# Patient Record
Sex: FEMALE | Race: WHITE | NOT HISPANIC OR LATINO | URBAN - METROPOLITAN AREA
[De-identification: names, ages, dates, MRNs, and addresses within clinical notes are randomized per-mention and may not be internally consistent; named-entity substitution may affect disease eponyms.]

---

## 2020-10-12 ENCOUNTER — OFFICE VISIT (OUTPATIENT)
Dept: URBAN - METROPOLITAN AREA CLINIC 80 | Facility: CLINIC | Age: 20
End: 2020-10-12

## 2020-10-22 ENCOUNTER — LAB OUTSIDE AN ENCOUNTER (OUTPATIENT)
Dept: URBAN - METROPOLITAN AREA CLINIC 80 | Facility: CLINIC | Age: 20
End: 2020-10-22

## 2020-10-22 ENCOUNTER — OFFICE VISIT (OUTPATIENT)
Dept: URBAN - METROPOLITAN AREA CLINIC 80 | Facility: CLINIC | Age: 20
End: 2020-10-22

## 2020-10-22 ENCOUNTER — WEB ENCOUNTER (OUTPATIENT)
Dept: URBAN - METROPOLITAN AREA CLINIC 80 | Facility: CLINIC | Age: 20
End: 2020-10-22

## 2020-10-22 DIAGNOSIS — K51.90 ULCERATIVE COLITIS WITHOUT COMPLICATIONS, UNSPECIFIED LOCATION: ICD-10-CM

## 2020-10-22 PROCEDURE — G8420 CALC BMI NORM PARAMETERS: HCPCS | Performed by: INTERNAL MEDICINE

## 2020-10-22 PROCEDURE — G8484 FLU IMMUNIZE NO ADMIN: HCPCS | Performed by: INTERNAL MEDICINE

## 2020-10-22 PROCEDURE — G8427 DOCREV CUR MEDS BY ELIG CLIN: HCPCS | Performed by: INTERNAL MEDICINE

## 2020-10-22 PROCEDURE — 99203 OFFICE O/P NEW LOW 30 MIN: CPT | Performed by: INTERNAL MEDICINE

## 2020-10-22 PROCEDURE — 1036F TOBACCO NON-USER: CPT | Performed by: INTERNAL MEDICINE

## 2020-10-22 RX ORDER — AZATHIOPRINE 50 MG/1
2 TABLETS TABLET ORAL QD
Qty: 180 TABLET | Refills: 1 | OUTPATIENT
Start: 2020-10-22 | End: 2021-04-20

## 2020-10-22 RX ORDER — MESALAMINE 1000 MG/1
1 SUPPOSITORY AT BEDTIME SUPPOSITORY RECTAL ONCE A DAY
Qty: 90 | Refills: 3 | OUTPATIENT
Start: 2020-10-22 | End: 2021-10-17

## 2020-10-22 RX ORDER — MESALAMINE 1000 MG/1
1 SUPPOSITORY AT BEDTIME SUPPOSITORY RECTAL ONCE A DAY
Status: ACTIVE | COMMUNITY

## 2020-10-22 RX ORDER — MESALAMINE 800 MG/1
2 TABLETS TABLET, DELAYED RELEASE ORAL THREE TIMES A DAY
Qty: 540 TABLET | Refills: 3 | OUTPATIENT
Start: 2020-10-22 | End: 2021-10-17

## 2020-10-22 NOTE — HPI-TODAY'S VISIT:
Dx UC 2012  Needing to establish care  Has been on Mesalamine po and supp, Azathioprine  Last Colonoscopy only last 2 inches involved Having yearly Colonoscopy  GM, mom, sister all have UC

## 2020-10-23 LAB
A/G RATIO: 1.8
ALBUMIN: 4.8
ALKALINE PHOSPHATASE: 48
ALT (SGPT): 7
AST (SGOT): 17
BASO (ABSOLUTE): 0
BASOS: 1
BILIRUBIN, TOTAL: 0.8
BUN/CREATININE RATIO: 13
BUN: 11
C-REACTIVE PROTEIN, QUANT: 2
CALCIUM: 9.5
CARBON DIOXIDE, TOTAL: 21
CHLORIDE: 106
CREATININE: 0.83
EGFR IF AFRICN AM: 116
EGFR IF NONAFRICN AM: 100
EOS (ABSOLUTE): 0.1
EOS: 2
GLOBULIN, TOTAL: 2.6
GLUCOSE: 75
HEMATOCRIT: 37.6
HEMATOLOGY COMMENTS:: (no result)
HEMOGLOBIN: 13.6
IMMATURE CELLS: (no result)
IMMATURE GRANS (ABS): 0
IMMATURE GRANULOCYTES: 0
LYMPHS (ABSOLUTE): 0.7
LYMPHS: 18
MCH: 33.4
MCHC: 36.2
MCV: 92
MONOCYTES(ABSOLUTE): 0.3
MONOCYTES: 7
NEUTROPHILS (ABSOLUTE): 2.7
NEUTROPHILS: 72
NRBC: (no result)
PLATELETS: 249
POTASSIUM: 4.3
PROTEIN, TOTAL: 7.4
RBC: 4.07
RDW: 12.7
SEDIMENTATION RATE-WESTERGREN: 4
SODIUM: 140
WBC: 3.8

## 2021-01-08 ENCOUNTER — OFFICE VISIT (OUTPATIENT)
Dept: URBAN - METROPOLITAN AREA SURGERY CENTER 19 | Facility: SURGERY CENTER | Age: 21
End: 2021-01-08

## 2021-01-08 ENCOUNTER — CLAIMS CREATED FROM THE CLAIM WINDOW (OUTPATIENT)
Dept: URBAN - METROPOLITAN AREA CLINIC 4 | Facility: CLINIC | Age: 21
End: 2021-01-08

## 2021-01-08 ENCOUNTER — TELEPHONE ENCOUNTER (OUTPATIENT)
Dept: URBAN - METROPOLITAN AREA CLINIC 92 | Facility: CLINIC | Age: 21
End: 2021-01-08

## 2021-01-08 DIAGNOSIS — K63.89 OTHER SPECIFIED DISEASES OF INTESTINE: ICD-10-CM

## 2021-01-08 DIAGNOSIS — K51.80 OTHER ULCERATIVE COLITIS WITHOUT COMPLICATIONS: ICD-10-CM

## 2021-01-08 DIAGNOSIS — K51.20 CHRONIC ULCERATIVE PROCTITIS WITHOUT COMPLICATIONS: ICD-10-CM

## 2021-01-08 PROCEDURE — 88305 TISSUE EXAM BY PATHOLOGIST: CPT | Performed by: PATHOLOGY

## 2021-01-08 PROCEDURE — 45380 COLONOSCOPY AND BIOPSY: CPT | Performed by: INTERNAL MEDICINE

## 2021-01-08 PROCEDURE — 88342 IMHCHEM/IMCYTCHM 1ST ANTB: CPT | Performed by: PATHOLOGY

## 2021-01-08 PROCEDURE — G8907 PT DOC NO EVENTS ON DISCHARG: HCPCS | Performed by: INTERNAL MEDICINE

## 2021-01-08 PROCEDURE — G9937 DIG OR SURV COLSCO: HCPCS | Performed by: INTERNAL MEDICINE

## 2021-01-08 RX ORDER — MESALAMINE 800 MG/1
2 TABLETS TABLET, DELAYED RELEASE ORAL THREE TIMES A DAY
Qty: 540 TABLET | Refills: 3
Start: 2020-10-22 | End: 2021-10-17

## 2021-01-08 RX ORDER — MESALAMINE 800 MG/1
2 TABLETS TABLET, DELAYED RELEASE ORAL THREE TIMES A DAY
Qty: 540 TABLET | Refills: 3 | Status: ACTIVE | COMMUNITY
Start: 2020-10-22 | End: 2021-10-17

## 2021-01-08 RX ORDER — MESALAMINE 1000 MG/1
1 SUPPOSITORY AT BEDTIME SUPPOSITORY RECTAL ONCE A DAY
Qty: 90 | Refills: 3

## 2021-01-08 RX ORDER — AZATHIOPRINE 50 MG/1
2 TABLETS TABLET ORAL QD
Qty: 180 TABLET | Refills: 1 | Status: ACTIVE | COMMUNITY
Start: 2020-10-22 | End: 2021-04-20

## 2021-01-08 RX ORDER — MESALAMINE 1000 MG/1
1 SUPPOSITORY AT BEDTIME SUPPOSITORY RECTAL ONCE A DAY
Status: ACTIVE | COMMUNITY

## 2021-01-08 RX ORDER — MESALAMINE 1000 MG/1
1 SUPPOSITORY AT BEDTIME SUPPOSITORY RECTAL ONCE A DAY
Qty: 90 | Refills: 3 | Status: ACTIVE | COMMUNITY
Start: 2020-10-22 | End: 2021-10-17

## 2021-05-03 ENCOUNTER — TELEPHONE ENCOUNTER (OUTPATIENT)
Dept: URBAN - METROPOLITAN AREA CLINIC 80 | Facility: CLINIC | Age: 21
End: 2021-05-03

## 2021-05-03 RX ORDER — AZATHIOPRINE 50 MG/1
2 TABLETS TABLET ORAL DAILY
Qty: 90 | Refills: 3

## 2021-10-20 ENCOUNTER — ERX REFILL RESPONSE (OUTPATIENT)
Dept: URBAN - METROPOLITAN AREA CLINIC 80 | Facility: CLINIC | Age: 21
End: 2021-10-20

## 2021-10-20 RX ORDER — AZATHIOPRINE 50 1/1
TAKE 2 TABLETS BY MOUTH DAILY TABLET ORAL
Qty: 90 TABLET | Refills: 1 | OUTPATIENT

## 2021-10-20 RX ORDER — AZATHIOPRINE 50 MG/1
2 TABLETS TABLET ORAL DAILY
Qty: 90 | Refills: 3 | OUTPATIENT

## 2021-12-20 ENCOUNTER — ERX REFILL RESPONSE (OUTPATIENT)
Dept: URBAN - METROPOLITAN AREA CLINIC 80 | Facility: CLINIC | Age: 21
End: 2021-12-20

## 2021-12-20 RX ORDER — AZATHIOPRINE 50 1/1
TAKE 2 TABLETS BY MOUTH DAILY TABLET ORAL
Qty: 90 TABLET | Refills: 1 | OUTPATIENT

## 2021-12-20 RX ORDER — AZATHIOPRINE 50 1/1
TAKE 2 TABLET BY MOUTH EVERY DAY TABLET ORAL
Qty: 90 TABLET | Refills: 1 | OUTPATIENT

## 2022-01-06 ENCOUNTER — OFFICE VISIT (OUTPATIENT)
Dept: URBAN - METROPOLITAN AREA CLINIC 80 | Facility: CLINIC | Age: 22
End: 2022-01-06

## 2022-01-21 ENCOUNTER — ERX REFILL RESPONSE (OUTPATIENT)
Dept: URBAN - METROPOLITAN AREA CLINIC 80 | Facility: CLINIC | Age: 22
End: 2022-01-21

## 2022-01-21 RX ORDER — AZATHIOPRINE 50 1/1
TAKE 2 TABLET SPO EVERY DAY TABLET ORAL
Qty: 90 TABLET | Refills: 1 | OUTPATIENT

## 2022-01-21 RX ORDER — AZATHIOPRINE 50 1/1
TAKE 2 TABLET BY MOUTH EVERY DAY TABLET ORAL
Qty: 90 TABLET | Refills: 1 | OUTPATIENT

## 2022-02-02 ENCOUNTER — TELEPHONE ENCOUNTER (OUTPATIENT)
Dept: URBAN - METROPOLITAN AREA CLINIC 80 | Facility: CLINIC | Age: 22
End: 2022-02-02

## 2022-02-02 RX ORDER — MESALAMINE 1000 MG/1
1 SUPPOSITORY AT BEDTIME SUPPOSITORY RECTAL ONCE A DAY
Qty: 90 | Refills: 3
End: 2023-01-28

## 2022-02-02 RX ORDER — AZATHIOPRINE 50 1/1
TAKE 2 TABLET SPO EVERY DAY TABLET ORAL QD
Qty: 180 | Refills: 3

## 2022-02-04 ENCOUNTER — ERX REFILL RESPONSE (OUTPATIENT)
Dept: URBAN - METROPOLITAN AREA CLINIC 80 | Facility: CLINIC | Age: 22
End: 2022-02-04

## 2022-02-04 RX ORDER — AZATHIOPRINE 50 1/1
TAKE 2 TABLETS BY MOUTH EVERY DAY TABLET ORAL
Qty: 180 TABLET | Refills: 1 | OUTPATIENT

## 2022-02-04 RX ORDER — AZATHIOPRINE 50 1/1
TAKE 2 TABLET SPO EVERY DAY TABLET ORAL
Qty: 90 TABLET | Refills: 1 | OUTPATIENT

## 2022-02-10 ENCOUNTER — TELEPHONE ENCOUNTER (OUTPATIENT)
Dept: URBAN - METROPOLITAN AREA CLINIC 80 | Facility: CLINIC | Age: 22
End: 2022-02-10

## 2022-02-10 RX ORDER — MESALAMINE 800 MG/1
2 TABLETS TABLET, DELAYED RELEASE ORAL THREE TIMES A DAY
Qty: 180 | OUTPATIENT
Start: 2022-02-10 | End: 2022-03-12

## 2022-02-24 ENCOUNTER — WEB ENCOUNTER (OUTPATIENT)
Dept: URBAN - METROPOLITAN AREA CLINIC 80 | Facility: CLINIC | Age: 22
End: 2022-02-24

## 2022-02-28 ENCOUNTER — OFFICE VISIT (OUTPATIENT)
Dept: URBAN - METROPOLITAN AREA CLINIC 80 | Facility: CLINIC | Age: 22
End: 2022-02-28

## 2022-02-28 DIAGNOSIS — Z80.0 FAMILY HISTORY OF COLON CANCER: ICD-10-CM

## 2022-02-28 DIAGNOSIS — K51.90 ULCERATIVE COLITIS WITHOUT COMPLICATIONS, UNSPECIFIED LOCATION: ICD-10-CM

## 2022-02-28 PROCEDURE — 99214 OFFICE O/P EST MOD 30 MIN: CPT | Performed by: INTERNAL MEDICINE

## 2022-02-28 RX ORDER — MESALAMINE 1000 MG/1
1 SUPPOSITORY AT BEDTIME SUPPOSITORY RECTAL ONCE A DAY
Qty: 90 | Refills: 3 | OUTPATIENT
Start: 2022-02-28 | End: 2023-02-23

## 2022-02-28 RX ORDER — MESALAMINE 800 MG/1
2 TABLETS TABLET, DELAYED RELEASE ORAL THREE TIMES A DAY
Qty: 540 TABLET | Refills: 3 | OUTPATIENT
Start: 2022-02-28 | End: 2023-02-23

## 2022-02-28 RX ORDER — MESALAMINE 800 MG/1
2 TABLETS TABLET, DELAYED RELEASE ORAL THREE TIMES A DAY
Qty: 180 | Status: ACTIVE | COMMUNITY
Start: 2022-02-10 | End: 2022-03-12

## 2022-02-28 RX ORDER — MESALAMINE 1000 MG/1
1 SUPPOSITORY AT BEDTIME SUPPOSITORY RECTAL ONCE A DAY
Qty: 90 | Refills: 3 | Status: ACTIVE | COMMUNITY
End: 2023-01-28

## 2022-02-28 RX ORDER — AZATHIOPRINE 50 MG/1
2 TABLETS TABLET ORAL QD
Qty: 180 TABLET | Refills: 3 | OUTPATIENT
Start: 2022-02-28 | End: 2023-02-23

## 2022-02-28 RX ORDER — AZATHIOPRINE 50 1/1
TAKE 2 TABLETS BY MOUTH EVERY DAY TABLET ORAL
Qty: 180 TABLET | Refills: 1 | Status: ACTIVE | COMMUNITY

## 2022-02-28 NOTE — HPI-TODAY'S VISIT:
Doing well.  Issues w tiny bit of blood every few weeks but mostly doing really well.   Taking Meslamaine po and suppository and Azathioprine. Compliant. No issues.  Sister recently Dx CRC age 28.  She and  are thinking about starting a family in the next year or 2

## 2022-03-01 LAB
A/G RATIO: 1.7
ALBUMIN: 4.7
ALKALINE PHOSPHATASE: 56
ALT (SGPT): 6
AST (SGOT): 14
BILIRUBIN, TOTAL: 0.7
BUN/CREATININE RATIO: 8
BUN: 7
C-REACTIVE PROTEIN, QUANT: 2
CALCIUM: 9.5
CARBON DIOXIDE, TOTAL: 18
CHLORIDE: 107
CREATININE: 0.83
EGFR: 102
GLOBULIN, TOTAL: 2.8
GLUCOSE: 84
HEMATOCRIT: 36.3
HEMOGLOBIN: 12.6
MCH: 33.8
MCHC: 34.7
MCV: 97
NRBC: (no result)
PLATELETS: 223
POTASSIUM: 4
PROTEIN, TOTAL: 7.5
RBC: 3.73
RDW: 12.9
SEDIMENTATION RATE-WESTERGREN: 2
SODIUM: 141
WBC: 4.1

## 2022-04-17 ENCOUNTER — OUT OF OFFICE VISIT (OUTPATIENT)
Dept: URBAN - METROPOLITAN AREA MEDICAL CENTER 18 | Facility: MEDICAL CENTER | Age: 22
End: 2022-04-17

## 2022-04-17 DIAGNOSIS — K35.32 PERFORATED APPENDICITIS: ICD-10-CM

## 2022-04-17 DIAGNOSIS — K51.80 CHRONIC PANCOLONIC ULCERATIVE COLITIS: ICD-10-CM

## 2022-04-17 PROCEDURE — G8427 DOCREV CUR MEDS BY ELIG CLIN: HCPCS | Performed by: INTERNAL MEDICINE

## 2022-04-17 PROCEDURE — 99222 1ST HOSP IP/OBS MODERATE 55: CPT | Performed by: INTERNAL MEDICINE

## 2022-04-20 ENCOUNTER — CLAIMS CREATED FROM THE CLAIM WINDOW (OUTPATIENT)
Dept: URBAN - METROPOLITAN AREA MEDICAL CENTER 18 | Facility: MEDICAL CENTER | Age: 22
End: 2022-04-20

## 2022-04-20 DIAGNOSIS — K51.80 CHRONIC PANCOLONIC ULCERATIVE COLITIS: ICD-10-CM

## 2022-04-20 DIAGNOSIS — K35.32 PERFORATED APPENDICITIS: ICD-10-CM

## 2022-04-20 PROCEDURE — 99232 SBSQ HOSP IP/OBS MODERATE 35: CPT | Performed by: INTERNAL MEDICINE

## 2022-04-22 ENCOUNTER — CLAIMS CREATED FROM THE CLAIM WINDOW (OUTPATIENT)
Dept: URBAN - METROPOLITAN AREA MEDICAL CENTER 18 | Facility: MEDICAL CENTER | Age: 22
End: 2022-04-22

## 2022-04-22 DIAGNOSIS — K51.80 CHRONIC PANCOLONIC ULCERATIVE COLITIS: ICD-10-CM

## 2022-04-22 DIAGNOSIS — K35.32 ACUTE APPENDICITIS WITH PERFORATION AND LOCALIZED PERITONITIS, WITHOUT ABSCESS: ICD-10-CM

## 2022-04-22 PROCEDURE — 99233 SBSQ HOSP IP/OBS HIGH 50: CPT | Performed by: INTERNAL MEDICINE

## 2022-04-23 ENCOUNTER — CLAIMS CREATED FROM THE CLAIM WINDOW (OUTPATIENT)
Dept: URBAN - METROPOLITAN AREA MEDICAL CENTER 18 | Facility: MEDICAL CENTER | Age: 22
End: 2022-04-23

## 2022-04-23 DIAGNOSIS — K51.80 CHRONIC PANCOLONIC ULCERATIVE COLITIS: ICD-10-CM

## 2022-04-23 DIAGNOSIS — K35.32 ACUTE APPENDICITIS WITH PERFORATION AND LOCALIZED PERITONITIS, WITHOUT ABSCESS: ICD-10-CM

## 2022-04-23 PROCEDURE — 99233 SBSQ HOSP IP/OBS HIGH 50: CPT | Performed by: INTERNAL MEDICINE

## 2022-04-25 ENCOUNTER — OUT OF OFFICE VISIT (OUTPATIENT)
Dept: URBAN - METROPOLITAN AREA MEDICAL CENTER 18 | Facility: MEDICAL CENTER | Age: 22
End: 2022-04-25

## 2022-04-25 DIAGNOSIS — R93.3 ABN FINDINGS-GI TRACT: ICD-10-CM

## 2022-04-25 DIAGNOSIS — K51.80 CHRONIC PANCOLONIC ULCERATIVE COLITIS: ICD-10-CM

## 2022-04-25 PROCEDURE — 99232 SBSQ HOSP IP/OBS MODERATE 35: CPT | Performed by: INTERNAL MEDICINE

## 2022-04-25 PROCEDURE — 99231 SBSQ HOSP IP/OBS SF/LOW 25: CPT | Performed by: INTERNAL MEDICINE

## 2022-04-28 ENCOUNTER — TELEPHONE ENCOUNTER (OUTPATIENT)
Dept: URBAN - METROPOLITAN AREA CLINIC 80 | Facility: CLINIC | Age: 22
End: 2022-04-28

## 2022-05-12 ENCOUNTER — OFFICE VISIT (OUTPATIENT)
Dept: URBAN - METROPOLITAN AREA CLINIC 80 | Facility: CLINIC | Age: 22
End: 2022-05-12

## 2022-05-12 DIAGNOSIS — Z80.0 FAMILY HISTORY OF COLON CANCER: ICD-10-CM

## 2022-05-12 DIAGNOSIS — K51.80 CHRONIC PANCOLONIC ULCERATIVE COLITIS: ICD-10-CM

## 2022-05-12 PROBLEM — 64766004: Status: ACTIVE | Noted: 2020-10-22

## 2022-05-12 PROCEDURE — 99213 OFFICE O/P EST LOW 20 MIN: CPT | Performed by: INTERNAL MEDICINE

## 2022-05-12 RX ORDER — MESALAMINE 800 MG/1
2 TABLETS TABLET, DELAYED RELEASE ORAL THREE TIMES A DAY
Qty: 540 TABLET | Refills: 3 | OUTPATIENT

## 2022-05-12 RX ORDER — AZATHIOPRINE 50 1/1
TAKE 2 TABLETS BY MOUTH EVERY DAY TABLET ORAL
Qty: 180 TABLET | Refills: 1 | Status: ACTIVE | COMMUNITY

## 2022-05-12 RX ORDER — MESALAMINE 1000 MG/1
1 SUPPOSITORY AT BEDTIME SUPPOSITORY RECTAL ONCE A DAY
Qty: 90 | Refills: 3 | OUTPATIENT

## 2022-05-12 RX ORDER — MESALAMINE 1000 MG/1
1 SUPPOSITORY AT BEDTIME SUPPOSITORY RECTAL ONCE A DAY
Qty: 90 | Refills: 3 | Status: ACTIVE | COMMUNITY
End: 2023-01-28

## 2022-05-12 RX ORDER — MESALAMINE 800 MG/1
2 TABLETS TABLET, DELAYED RELEASE ORAL THREE TIMES A DAY
Qty: 540 TABLET | Refills: 3 | Status: ACTIVE | COMMUNITY
Start: 2022-02-28 | End: 2023-02-23

## 2022-05-12 RX ORDER — AZATHIOPRINE 50 MG/1
2 TABLETS TABLET ORAL QD
Qty: 180 TABLET | Refills: 3 | OUTPATIENT

## 2022-05-12 NOTE — HPI-TODAY'S VISIT:
Pt had her appendix out 2 1/2 weeks ago - had an abscess as well She is feeling ok - still a little sore if she moves too much She stopped all her meds after the surgery She is feeling the same as prior to the surgery which is good She is having 1 BM a day No BRBPR or melena No nausea or emesis No fevers or chills No joint pain Not on antibiotics anymore ct scan on 4/26 before she left the hospital showed a rim enhancing fluid collection but nothing left to drain and it was pulled

## 2022-06-03 ENCOUNTER — TELEPHONE ENCOUNTER (OUTPATIENT)
Dept: URBAN - METROPOLITAN AREA CLINIC 80 | Facility: CLINIC | Age: 22
End: 2022-06-03

## 2022-06-03 RX ORDER — CIPROFLOXACIN HYDROCHLORIDE 250 MG/1
1 TABLET TABLET, FILM COATED ORAL
Qty: 6 | OUTPATIENT
Start: 2022-06-03 | End: 2022-06-06

## 2023-03-07 ENCOUNTER — TELEPHONE ENCOUNTER (OUTPATIENT)
Dept: URBAN - METROPOLITAN AREA CLINIC 19 | Facility: CLINIC | Age: 23
End: 2023-03-07

## 2023-03-07 RX ORDER — AZATHIOPRINE 50 MG/1
2 TABLETS TABLET ORAL QD
Qty: 180 TABLET | Refills: 3
End: 2024-03-01

## 2023-03-07 RX ORDER — MESALAMINE 800 MG/1
2 TABLETS TABLET, DELAYED RELEASE ORAL THREE TIMES A DAY
Qty: 540 TABLET | Refills: 3
End: 2024-03-01

## 2023-06-20 ENCOUNTER — OFFICE VISIT (OUTPATIENT)
Dept: URBAN - METROPOLITAN AREA TELEHEALTH 2 | Facility: TELEHEALTH | Age: 23
End: 2023-06-20

## 2023-06-20 ENCOUNTER — DASHBOARD ENCOUNTERS (OUTPATIENT)
Age: 23
End: 2023-06-20

## 2023-06-20 VITALS — BODY MASS INDEX: 21.05 KG/M2 | HEIGHT: 66 IN | WEIGHT: 131 LBS

## 2023-06-20 DIAGNOSIS — K51.80 CHRONIC PANCOLONIC ULCERATIVE COLITIS: ICD-10-CM

## 2023-06-20 DIAGNOSIS — Z80.0 FAMILY HISTORY OF COLON CANCER: ICD-10-CM

## 2023-06-20 DIAGNOSIS — Z90.49 HISTORY OF APPENDECTOMY: ICD-10-CM

## 2023-06-20 PROBLEM — 428251008: Status: ACTIVE | Noted: 2023-06-20

## 2023-06-20 PROCEDURE — 99214 OFFICE O/P EST MOD 30 MIN: CPT | Performed by: INTERNAL MEDICINE

## 2023-06-20 RX ORDER — MESALAMINE 1000 MG/1
1 SUPPOSITORY AT BEDTIME SUPPOSITORY RECTAL ONCE A DAY
Qty: 90 | Refills: 3 | OUTPATIENT
Start: 2023-06-20 | End: 2024-06-14

## 2023-06-20 RX ORDER — AZATHIOPRINE 50 1/1
TAKE 2 TABLETS BY MOUTH EVERY DAY TABLET ORAL
Qty: 180 TABLET | Refills: 1 | Status: ACTIVE | COMMUNITY

## 2023-06-20 RX ORDER — AZATHIOPRINE 50 MG/1
2 TABLET TABLET ORAL DAILY
Qty: 180 TABLET | Refills: 3 | OUTPATIENT
Start: 2023-06-20 | End: 2024-06-14

## 2023-06-20 RX ORDER — MESALAMINE 800 MG/1
2 TABLETS TABLET, DELAYED RELEASE ORAL THREE TIMES A DAY
Qty: 540 TABLET | Refills: 3 | OUTPATIENT
Start: 2023-06-20 | End: 2024-06-14

## 2023-06-20 RX ORDER — MESALAMINE 800 MG/1
2 TABLETS TABLET, DELAYED RELEASE ORAL THREE TIMES A DAY
Qty: 540 TABLET | Refills: 3 | Status: ACTIVE | COMMUNITY
End: 2024-03-01

## 2023-06-20 RX ORDER — MESALAMINE 1000 MG/1
1 SUPPOSITORY AT BEDTIME SUPPOSITORY RECTAL ONCE A DAY
Qty: 90 | Refills: 3 | Status: ON HOLD | COMMUNITY

## 2023-06-20 NOTE — HPI-TODAY'S VISIT:
Taking Mesalamine 800 2 T TID, suppositories daily, AZA 100mg qd  Things are the same/good.   s/p AP last year, so Colonoscopy was not done.  Sister is doing well, currently [place]

## 2023-08-04 ENCOUNTER — OFFICE VISIT (OUTPATIENT)
Dept: URBAN - METROPOLITAN AREA SURGERY CENTER 19 | Facility: SURGERY CENTER | Age: 23
End: 2023-08-04